# Patient Record
Sex: FEMALE | Race: WHITE | Employment: STUDENT | URBAN - METROPOLITAN AREA
[De-identification: names, ages, dates, MRNs, and addresses within clinical notes are randomized per-mention and may not be internally consistent; named-entity substitution may affect disease eponyms.]

---

## 2018-12-26 ENCOUNTER — OFFICE VISIT (OUTPATIENT)
Dept: FAMILY MEDICINE CLINIC | Facility: CLINIC | Age: 23
End: 2018-12-26
Payer: COMMERCIAL

## 2018-12-26 VITALS
HEIGHT: 65 IN | SYSTOLIC BLOOD PRESSURE: 112 MMHG | DIASTOLIC BLOOD PRESSURE: 69 MMHG | RESPIRATION RATE: 20 BRPM | TEMPERATURE: 98 F | BODY MASS INDEX: 21.66 KG/M2 | OXYGEN SATURATION: 99 % | HEART RATE: 70 BPM | WEIGHT: 130 LBS

## 2018-12-26 DIAGNOSIS — R39.9 UTI SYMPTOMS: Primary | ICD-10-CM

## 2018-12-26 DIAGNOSIS — R35.0 URINARY FREQUENCY: ICD-10-CM

## 2018-12-26 PROCEDURE — 81003 URINALYSIS AUTO W/O SCOPE: CPT | Performed by: FAMILY MEDICINE

## 2018-12-26 PROCEDURE — 99202 OFFICE O/P NEW SF 15 MIN: CPT | Performed by: FAMILY MEDICINE

## 2018-12-26 RX ORDER — NITROFURANTOIN 25; 75 MG/1; MG/1
100 CAPSULE ORAL 2 TIMES DAILY
Qty: 14 CAPSULE | Refills: 0 | Status: SHIPPED | OUTPATIENT
Start: 2018-12-26 | End: 2019-01-02

## 2018-12-26 NOTE — PATIENT INSTRUCTIONS
Hold antibiotics for now. Increase fluids and rest.   Discontinue sudafed, as it may be causing the urinary retention and urgency. Use claritin/zyrtec instead for cold symptoms. If no better in 1-2 days, consider starting macrobid.    Take with food a

## 2018-12-27 NOTE — PROGRESS NOTES
Nicky Santiago is a 26year old female. HPI:   Patient presents with symptoms of UTI for urinary frequency and urgency x 3 days. Complaining of low volume when attempting to urinate. Denies suprapubick pain   Denies back pain, fever, hematuria.   Pt has Negative mg/dL    SPECIFIC GRAVITY 1.015 1.005 - 1.030    OCCULT BLOOD Negative Negative    PH, URINE 7.0 4.5 - 8.0    PROTEIN (URINE DIPSTICK) Negative Negative/Trace mg/dL    UROBILINOGEN,SEMI-QN 0.2 0.0 - 1.9 mg/dL    NITRITE, URINE Negative Negative